# Patient Record
Sex: FEMALE | Race: AMERICAN INDIAN OR ALASKA NATIVE
[De-identification: names, ages, dates, MRNs, and addresses within clinical notes are randomized per-mention and may not be internally consistent; named-entity substitution may affect disease eponyms.]

---

## 2018-06-14 ENCOUNTER — HOSPITAL ENCOUNTER (EMERGENCY)
Dept: HOSPITAL 5 - ED | Age: 58
Discharge: HOME | End: 2018-06-14
Payer: MEDICARE

## 2018-06-14 VITALS — SYSTOLIC BLOOD PRESSURE: 126 MMHG | DIASTOLIC BLOOD PRESSURE: 82 MMHG

## 2018-06-14 DIAGNOSIS — F17.200: ICD-10-CM

## 2018-06-14 DIAGNOSIS — M19.90: ICD-10-CM

## 2018-06-14 DIAGNOSIS — I10: ICD-10-CM

## 2018-06-14 DIAGNOSIS — I16.0: ICD-10-CM

## 2018-06-14 DIAGNOSIS — E86.0: ICD-10-CM

## 2018-06-14 DIAGNOSIS — F20.9: Primary | ICD-10-CM

## 2018-06-14 LAB
BASOPHILS # (AUTO): 0.1 K/MM3 (ref 0–0.1)
BASOPHILS NFR BLD AUTO: 0.9 % (ref 0–1.8)
BENZODIAZEPINES SCREEN,URINE: (no result)
BILIRUB UR QL STRIP: (no result)
BLOOD UR QL VISUAL: (no result)
BUN SERPL-MCNC: 21 MG/DL (ref 7–17)
BUN/CREAT SERPL: 26 %
CALCIUM SERPL-MCNC: 9.2 MG/DL (ref 8.4–10.2)
EOSINOPHIL # BLD AUTO: 0.2 K/MM3 (ref 0–0.4)
EOSINOPHIL NFR BLD AUTO: 2 % (ref 0–4.3)
HCT VFR BLD CALC: 47.6 % (ref 30.3–42.9)
HEMOLYSIS INDEX: 82
HGB BLD-MCNC: 16.2 GM/DL (ref 10.1–14.3)
LYMPHOCYTES # BLD AUTO: 4.1 K/MM3 (ref 1.2–5.4)
LYMPHOCYTES NFR BLD AUTO: 50.1 % (ref 13.4–35)
MCH RBC QN AUTO: 31 PG (ref 28–32)
MCHC RBC AUTO-ENTMCNC: 34 % (ref 30–34)
MCV RBC AUTO: 92 FL (ref 79–97)
METHADONE SCREEN,URINE: (no result)
MONOCYTES # (AUTO): 0.4 K/MM3 (ref 0–0.8)
MONOCYTES % (AUTO): 5.2 % (ref 0–7.3)
MUCOUS THREADS #/AREA URNS HPF: (no result) /HPF
OPIATE SCREEN,URINE: (no result)
PH UR STRIP: 5 [PH] (ref 5–7)
PLATELET # BLD: 240 K/MM3 (ref 140–440)
PROT UR STRIP-MCNC: (no result) MG/DL
RBC # BLD AUTO: 5.2 M/MM3 (ref 3.65–5.03)
RBC #/AREA URNS HPF: 1 /HPF (ref 0–6)
UROBILINOGEN UR-MCNC: < 2 MG/DL (ref ?–2)
WBC #/AREA URNS HPF: 1 /HPF (ref 0–6)

## 2018-06-14 PROCEDURE — 80048 BASIC METABOLIC PNL TOTAL CA: CPT

## 2018-06-14 PROCEDURE — 81001 URINALYSIS AUTO W/SCOPE: CPT

## 2018-06-14 PROCEDURE — G0480 DRUG TEST DEF 1-7 CLASSES: HCPCS

## 2018-06-14 PROCEDURE — 80307 DRUG TEST PRSMV CHEM ANLYZR: CPT

## 2018-06-14 PROCEDURE — 85025 COMPLETE CBC W/AUTO DIFF WBC: CPT

## 2018-06-14 PROCEDURE — 99285 EMERGENCY DEPT VISIT HI MDM: CPT

## 2018-06-14 PROCEDURE — 80320 DRUG SCREEN QUANTALCOHOLS: CPT

## 2018-06-14 PROCEDURE — 36415 COLL VENOUS BLD VENIPUNCTURE: CPT

## 2018-06-14 NOTE — EMERGENCY DEPARTMENT REPORT
HPI





- General


Chief Complaint: Psych


Time Seen by Provider: 06/14/18 20:52





- HPI


HPI: 








The patient is a 57-year-old female who presents for evaluation of mental 

health.  The patient has history of schizophrenia.  The patient admits to mild 

infrequent episodes of sadness/depression, but denies any thoughts of 

hopelessness or suicidal ideation. The patient also denies fever, headache, 

unexplained weight loss or weight gain, heat or cold intolerance, skin, hair, 

or nail changes, neuro deficits, homicidal ideations, or auditory or visual 

hallucinations.








ED Past Medical Hx





- Past Medical History


Previous Medical History?: Yes


Hx Hypertension: Yes


Hx Arthritis: Yes


Hx Psychiatric Treatment: Yes (schizophrenia)





- Surgical History


Past Surgical History?: No





- Social History


Smoking Status: Current Every Day Smoker


Substance Use Type: Alcohol





- Medications


Home Medications: 


 Home Medications











 Medication  Instructions  Recorded  Confirmed  Last Taken  Type


 


OLANzapine [ZyPREXA] 5 mg PO QDAY #30 tablet 06/14/18  Unknown Rx


 


amLODIPine [Norvasc] 5 mg PO DAILY #31 tab 06/14/18  Unknown Rx














ED Review of Systems


ROS: 


Stated complaint: PSYCH EVAL


Other details as noted in HPI








Constitutional: denies: fever


ENT: denies: throat or neck pain


Respiratory: denies: cough, shortness of breath


Cardiovascular: denies: chest pain


Endocrine: denies unexplained weight loss or gain


Gastrointestinal: denies: abdominal pain, nausea


Genitourinary: denies: dysuria


Musculoskeletal: denies: leg swelling


Skin: denies: rash


Neurological: denies: headache


Hematological/Lymphatic: denies: easy bleeding or easy bruising  


Psych: reports sadness denies  hopelessness











Physical Exam





- Physical Exam


Vital Signs: 


 Vital Signs











  06/14/18 06/14/18





  18:41 20:56


 


Temperature 98.9 F 


 


Pulse Rate 100 H 


 


Respiratory 16 18





Rate  


 


Blood Pressure 152/94 


 


O2 Sat by Pulse 96 





Oximetry  











Physical Exam: 











General: well-nourished, well-developed, no acute distress


Head: Normocephalic, atraumatic


Eyes: normal sclera


ENT: Mucous membranes are pink and moist 


Neck: trachea midline, neck supple, No neck stiffness, no cervical adenopathy


Respiratory: Breath sounds equal bilaterally, no wheezing, rales, or rhonchi


Cardio: S1 and S2 present, no murmurs, rubs, gallops, capillary refill is brisk


Abdomen: Normoactive bowel sounds, soft abdomen, no rigidity, no guarding or 

rebound tenderness


Chest WALL/Back: No tenderness to palpation of the chest wall, no CVA 

tenderness with percussion


Musc: No pitting edema


Skin: No rash


Neuro: Alert oriented 3, no facial drooping, normal speech, normal cognition, 

patient reliable


Psych: Normal affect, normal mood, appropriate insight, normal behavior, no 

hallucinations,








ED Course


 Vital Signs











  06/14/18 06/14/18





  18:41 20:56


 


Temperature 98.9 F 


 


Pulse Rate 100 H 


 


Respiratory 16 18





Rate  


 


Blood Pressure 152/94 


 


O2 Sat by Pulse 96 





Oximetry  














ED Medical Decision Making





- Lab Data


Result diagrams: 


 06/14/18 18:58





 06/14/18 18:58





- Medical Decision Making








The patient was seen and examined by myself.  The patient is placed on a 

cardiac monitor and continuous pulse ox. On initial evaluation, the patient was 

found to be in no distress.  Labs are obtained. Lab results reveal elevated 

hemoglobin and hematocrit, consistent with hemoconcentration and exam findings 

of dehydration, and otherwise labs were grossly unremarkable. The patient is 

medically clear.  Mental health is consulted. Mental health evaluates the 

patient and agrees that the patient is negative for findings concerning for 

acute psychosis or risk of harm to self or others.  The patient is given a 

tablet of her prescribed Zyprexa and a tablet of Norvasc for treatment of her 

elevated blood pressure. The patient declined Normal saline fluid bolus for 

treatment of her dehydration.  The patient was reevaluated and reported that 

their symptoms were markedly improved.  The patient is stable for discharge 

with outpatient follow-up.  The patient is given follow-up and return 

instructions.  The patient expressed understanding and agreed with the plan.  

The patient is discharged in stable condition.


Critical care attestation.: 


If time is entered above; I have spent that time in minutes in the direct care 

of this critically ill patient, excluding procedure time.








ED Disposition


Clinical Impression: 


 Mental health disorder, Hypertensive urgency, Dehydration





Disposition: DC-01 TO HOME OR SELFCARE


Is pt being admited?: No


Does the pt Need Aspirin: No


Condition: Stable


Instructions:  Hypertension (ED), Schizophrenia (ED), Mood Disorders (ED)


Referrals: 


PRIMARY CARE,MD [Primary Care Provider] - 3-5 Days


[facility] [Outside] - 3-5 Days


[facility] [Outside] - 3-5 Days


Time of Disposition: 22:57

## 2021-03-11 NOTE — EMERGENCY DEPARTMENT REPORT
ED Lower Extremity HPI





- General


Chief Complaint: Extremity Injury, Lower


Stated Complaint: L ANKLE INJURY


Time Seen by Provider: 21 14:32


Source: patient


Mode of arrival: Ambulatory


Limitations: Physical Limitation





- History of Present Illness


Initial Comments: 





This is a 56-year-old female nontoxic, well nourished in appearance, no acute 

signs of distress presents to the ED with c/o of left ankle and knee pain 1 

day.  Patient stated that when she was opening up her door she tripped on a 

stair and twisted left ankle and fell to left knee.  Patient denies any other 

injuries or trauma.  Patient denies any neck or back pain.  Denies any head 

trauma or injuries.  Patient denies any numbness, tingling, fever, chills, 

nausea, vomiting, chest pain, shortness of breath, headache, stiff neck.  

Patient denies any joint swelling or joint redness.  Patient denies decreased 

range of motion.  Patient stated has decreased gait due to pain.  Patient denies

any allergies.


MD Complaint: knee injury, ankle injury


-: days(s)


Injury: Knee: Left, Ankle: Left


Type of Injury: inversion


Place: home


Severity: mild


Severity scale (0 -10): 8


Improves With: immobilization


Worsens With: weight bearing, movement, palpation


Context: fall


Associated Symptoms: swelling, able to partially bear weight.  denies: snap/pop 

sensation, numbness, tingling, unable to bear weight





- Related Data


                                  Previous Rx's











 Medication  Instructions  Recorded  Last Taken  Type


 


OLANzapine [ZyPREXA] 5 mg PO QDAY #30 tablet 18 Unknown Rx


 


amLODIPine [Norvasc] 5 mg PO DAILY #31 tab 18 Unknown Rx


 


Naproxen 500 mg PO Q12H PRN #12 tablet 21 Unknown Rx











                                    Allergies











Allergy/AdvReac Type Severity Reaction Status Date / Time


 


No Known Allergies Allergy   Verified 18 18:41














ED Review of Systems


ROS: 


Stated complaint: L ANKLE INJURY


Other details as noted in HPI





Comment: All other systems reviewed and negative


Constitutional: denies: chills, fever


Eyes: denies: eye pain, eye discharge, vision change


ENT: denies: ear pain, throat pain


Respiratory: denies: cough, shortness of breath, wheezing


Cardiovascular: denies: chest pain, palpitations


Endocrine: no symptoms reported


Gastrointestinal: denies: abdominal pain, nausea, diarrhea


Genitourinary: denies: urgency, dysuria, discharge


Musculoskeletal: denies: back pain, joint swelling, arthralgia


Skin: denies: rash, lesions


Neurological: denies: headache, weakness, paresthesias


Psychiatric: denies: anxiety, depression


Hematological/Lymphatic: denies: easy bleeding, easy bruising





ED Past Medical Hx





- Past Medical History


Hx Hypertension: Yes


Hx Arthritis: Yes


Hx Psychiatric Treatment: Yes (schizophrenia)





- Surgical History


Past Surgical History?: Yes


Additional Surgical History: L hip replacement





- Social History


Smoking Status: Current Every Day Smoker


Substance Use Type: None





- Medications


Home Medications: 


                                Home Medications











 Medication  Instructions  Recorded  Confirmed  Last Taken  Type


 


OLANzapine [ZyPREXA] 5 mg PO QDAY #30 tablet 18  Unknown Rx


 


amLODIPine [Norvasc] 5 mg PO DAILY #31 tab 18  Unknown Rx


 


Naproxen 500 mg PO Q12H PRN #12 tablet 21  Unknown Rx














ED Physical Exam





- General


Limitations: Physical Limitation


General appearance: alert, in no apparent distress





- Head


Head exam: Present: atraumatic, normocephalic





- Eye


Eye exam: Present: normal appearance





- Neck


Neck exam: Present: normal inspection, full ROM





- Respiratory


Respiratory exam: Absent: respiratory distress





- Cardiovascular


Cardiovascular Exam: Present: regular rate





- Extremities Exam


Extremities exam: Present: full ROM, tenderness, normal capillary refill.  

Absent: joint swelling, calf tenderness





- Expanded Lower Extremity Exam


  ** Left


Hip exam: Present: normal inspection, full ROM.  Absent: tenderness, swelling


Upper Leg exam: Present: normal inspection, full ROM.  Absent: tenderness, 

swelling


Knee exam: Present: normal inspection, full ROM, tenderness, full knee 

extension.  Absent: swelling, abrasion, laceration, ecchymosis, deformity, 

crepidus, dislocation, erythema, effusion, pain w/ pronation/supination, 

posterior draw sign, pain/laxity with valgus, pain/laxity with varus


Lower Leg exam: Present: normal inspection, full ROM.  Absent: tenderness, 

swelling, abrasion, laceration, ecchymosis, deformity, crepidus, dislocation, 

erythema, palpable cord, Dell's sign


Ankle exam: Present: full ROM, tenderness, swelling.  Absent: abrasion, 

laceration, ecchymosis, deformity, crepidus, dislocation, erythema


Foot/Toe exam: Present: normal inspection, full ROM.  Absent: tenderness, 

swelling, abrasion, laceration, ecchymosis, deformity, crepidus, dislocation, 

erythema, amputation, puncture wound, foreign body, calcaneal tenderness, 

tenderness at base of 5th metatarsal, nail avulsion, subungual hematoma


Neuro vascular tendon exam: Present: no vascular compromise


Gait: Positive: observed and limited by pain





- Back Exam


Back exam: Present: normal inspection, full ROM.  Absent: tenderness, CVA 

tenderness (R), CVA tenderness (L), muscle spasm, paraspinal tenderness, 

vertebral tenderness, rash noted





- Neurological Exam


Neurological exam: Present: alert, oriented X3





- Psychiatric


Psychiatric exam: Present: normal affect, normal mood





- Skin


Skin exam: Present: warm, dry, intact, normal color.  Absent: rash





ED Course


                                   Vital Signs











  21





  14:34


 


Temperature 99.0 F


 


Pulse Rate 108 H


 


Respiratory 18





Rate 


 


Blood Pressure 144/95


 


O2 Sat by Pulse 99





Oximetry 














- Reevaluation(s)


Reevaluation #1: 





21 15:14


Patient is speaking in full sentences with no signs of distress noted.


Reevaluation #2: 





21 18:59


Patient return and stated she went out to eat and came back.  Patient is 

notified of the x-ray results.  Will place patient in room for a splint.





ED Lower Extremity MDM





- Radiology Data





[facility]  


                                     [address] [place] [address]  


 


                                            XRay Report   


                                               Signed  


 


Patient: SALLIE REINOSO                                            

                      


MR#: [id number]          


: 1960                                                                

Acct:[id number]      


 


Age/Sex: 60 / F                                                                

ADM Date: 21     


 


Loc: ED       


Attending Dr:   


 


 


Ordering Physician: OUMOU DENSON NP  


Date of Service: 21  


Procedure(s): XR ankle 3+V LT  


Accession Number(s): [id number]  


 


cc: OUMOU DENSON NP   


 


Fluoro Time In Minutes:   


 


Left ankle 3 views  


 


 INDICATION: Left ankle pain following fall  


 


 IMPRESSION: There is prominent swelling circumferentially involving the left 

ankle. There is a tiny


1 mm osseous fragment seen just inferior to the lateral malleolus which could 

represent a tiny 


avulsion fracture versus remote injury. Rectum and correlation with outside 

radiographs for further 


evaluation. There is diffuse osteopenia. No ankle dislocation identified.  


 


 Signer Name: Mike York MD   


 Signed: 3/11/2021 4:40 PM  


 Workstation Name: [id number]   


 


 


Transcribed By: BC  


Dictated By: Mike York MD  


Electronically Authenticated By: Mike York MD    


Signed Date/Time: 21 1640                                


 


 


 


DD/DT: 21                                                            

  


TD/TT:





[facility]  


                                     [address] [place] [address]  


 


                                            XRay Report   


                                               Signed  


 


Patient: SALLIE REINOSO                                            

                      


MR#: [id number]          


: 1960                                                                

Acct:[id number]      


 


Age/Sex: 60 / F                                                                

ADM Date: 21     


 


Loc: ED       


Attending Dr:   


 


 


Ordering Physician: OUMOU DENSON NP  


Date of Service: 21  


Procedure(s): XR knee 3V LT  


Accession Number(s): [id number]  


 


cc: OUMOU DENSON NP   


 


Fluoro Time In Minutes:   


 


LEFT KNEE 3 VIEWS  


 


 INDICATION / CLINICAL INFORMATION:  


 pain s/p fall  


 


 COMPARISON:  


 None available.  


 


 FINDINGS:  


 


 BONES / JOINT(S): There is a linear ossification medial to the medial femoral 

condyle. This may 


represent focal dystrophic calcification. It is possible this could represent an

 avulsion fragment. 


There is mild marginal osteophyte formation. The joint spaces are relatively 

well-maintained.  


 


 SOFT TISSUES: No significant abnormality.  


 


 ADDITIONAL FINDINGS: None.  


 


 


 


 Signer Name: Dennis Houston MD   


 Signed: 3/11/2021 4:41 PM  


 Workstation Name: [id number]   


 


 


Transcribed By:   


Dictated By: Dennis Houston MD  


Electronically Authenticated By: Dennis Houston MD    


Signed Date/Time: 21 1641                                


 


 


 


DD/DT: 21 163                                                            

  


TD/TT:





- Medical Decision Making





This is a 60-year-old female that presents with left ankle fracture with 

possible left knee fracture.  Patient is stable and was examined by me.  I 

referred patient to an orthopedic doctor for further evaluation for possible 

MRI.  X-ray has been obtained and dictated by the radiologist.  Patient is 

notified of the x-ray report with noted by the patient.  Patient received sugar 

tong to left ankle and a knee immobilizer to left knee.  Post splint assessment:

 neurovasular intact; normal cap refill <2 second; normal sensation; denies 

decreaed sensation; normal ROM of digits.  Patient was instructed to RICE ther

apy.  Patient received Motrin for pain.  At time of discharge, the patient does 

not seem toxic or ill in appearance.  No acute signs of distress noted.  Patient

 agrees to discharge treatment plan of care.  No further questions noted by the 

patient.


Critical care attestation.: 


If time is entered above; I have spent that time in minutes in the direct care 

of this critically ill patient, excluding procedure time.








ED Disposition


Clinical Impression: 


Closed left ankle fracture


Qualifiers:


 Encounter type: initial encounter Qualified Code(s): S82.892A - Other fracture 

of left lower leg, initial encounter for closed fracture





Strain of left knee


Qualifiers:


 Encounter type: initial encounter Qualified Code(s): S86.912A - Strain of 

unspecified muscle(s) and tendon(s) at lower leg level, left leg, initial 

encounter





Disposition:  TO HOME OR SELFCARE


Is pt being admited?: No


Does the pt Need Aspirin: No


Condition: Stable


Instructions:  RICE Therapy for Routine Care of Injuries, Easy-to-Read, Ankle 

Fracture, Cast or Splint Care, Adult, Crutch Use, Adult, Easy-to-Read


Additional Instructions: 


Follow-up with a orthopedic doctor in 3-5 days or if symptoms worsen and 

continue return to emergency room as soon as possible. 





No physical activity or weight bearing that extremity until cleared by 

orthopedic doctor


Prescriptions: 


Naproxen 500 mg PO Q12H PRN #12 tablet


 PRN Reason: Pain , Severe (7-10)


Referrals: 


PRIMARY CARE,MD [Referring] - 3-5 Days


OCTAVIA EDWARDS MD [Staff Physician] - 3-5 Days


Forms:  Work/School Release Form(ED)


Time of Disposition: 19:02

## 2021-03-11 NOTE — XRAY REPORT
Left ankle 3 views



INDICATION: Left ankle pain following fall



IMPRESSION: There is prominent swelling circumferentially involving the left ankle. There is a tiny 1
 mm osseous fragment seen just inferior to the lateral malleolus which could represent a tiny avulsio
n fracture versus remote injury. Rectum and correlation with outside radiographs for further evaluati
on. There is diffuse osteopenia. No ankle dislocation identified.



Signer Name: Mike York MD 

Signed: 3/11/2021 4:40 PM

Workstation Name: [id number]

## 2021-03-11 NOTE — XRAY REPORT
LEFT KNEE 3 VIEWS



INDICATION / CLINICAL INFORMATION:

pain s/p fall



COMPARISON:

None available.

 

FINDINGS:



BONES / JOINT(S): There is a linear ossification medial to the medial femoral condyle. This may repre
sent focal dystrophic calcification. It is possible this could represent an avulsion fragment. There 
is mild marginal osteophyte formation. The joint spaces are relatively well-maintained.



SOFT TISSUES: No significant abnormality.



ADDITIONAL FINDINGS: None.







Signer Name: Dennis Houston MD 

Signed: 3/11/2021 4:41 PM

Workstation Name: [id number]

## 2021-09-03 NOTE — EMERGENCY DEPARTMENT REPORT
HPI





- General


Chief Complaint: Psych


Time Seen by Provider: 09/03/21 10:57





- HPI


HPI: 





1100-Patient left prior to evaluation.  She could not be located in the 

emergency department.





ED Past Medical Hx





- Past Medical History


Previous Medical History?: Yes


Hx Hypertension: Yes


Hx Arthritis: Yes


Hx Psychiatric Treatment: Yes (schizophrenia)





- Surgical History


Past Surgical History?: Yes


Additional Surgical History: L hip replacement





- Social History


Smoking Status: Current Every Day Smoker


Substance Use Type: None





- Medications


Home Medications: 


                                Home Medications











 Medication  Instructions  Recorded  Confirmed  Last Taken  Type


 


OLANzapine [ZyPREXA] 5 mg PO QDAY #30 tablet 06/14/18  Unknown Rx


 


amLODIPine [Norvasc] 5 mg PO DAILY #31 tab 06/14/18  Unknown Rx


 


Naproxen 500 mg PO Q12H PRN #12 tablet 03/11/21  Unknown Rx














ED Review of Systems


ROS: 


Stated complaint: SUICIDAL IDEATIONS


Other details as noted in HPI








Physical Exam





- Physical Exam


Vital Signs: 


                                   Vital Signs











  09/03/21





  09:02


 


Temperature 98.5 F


 


Pulse Rate 89


 


Respiratory 20





Rate 


 


Blood Pressure 157/104


 


O2 Sat by Pulse 99





Oximetry 














ED Course


                                   Vital Signs











  09/03/21





  09:02


 


Temperature 98.5 F


 


Pulse Rate 89


 


Respiratory 20





Rate 


 


Blood Pressure 157/104


 


O2 Sat by Pulse 99





Oximetry 














- Reevaluation(s)


Reevaluation #1: 





09/03/21 10:58


Labs are ordered.


09/03/21 12:06


Patient cannot be located.  She has apparently left without treatment.  She was 

never seen by myself or any other provider.


Critical care attestation.: 


If time is entered above; I have spent that time in minutes in the direct care 

of this critically ill patient, excluding procedure time.








ED Disposition


Clinical Impression: 


 Suicidal ideation





Disposition: 07 LEFT WITHOUT BEING SEEN


Is pt being admited?: No


Does the pt Need Aspirin: No


Condition: Undetermined


Referrals: 


PRIMARY CARE,MD [Primary Care Provider] - 3-5 Days


Time of Disposition: 12:08